# Patient Record
(demographics unavailable — no encounter records)

---

## 2024-11-10 NOTE — REVIEW OF SYSTEMS
[Fever] : no fever [Chest Pain] : no chest pain [Shortness Of Breath] : no shortness of breath [Abdominal Pain] : no abdominal pain [Dysuria] : no dysuria [Joint Pain] : no joint pain [Memory Loss] : no memory loss

## 2024-11-10 NOTE — HISTORY OF PRESENT ILLNESS
[FreeTextEntry1] : cpe [de-identified] : no interval change in status  never got thyroid ultrasound

## 2024-11-10 NOTE — HEALTH RISK ASSESSMENT
[Never (0 pts)] : Never (0 points) [No] : In the past 12 months have you used drugs other than those required for medical reasons? No [0] : 2) Feeling down, depressed, or hopeless: Not at all (0) [Fully functional (bathing, dressing, toileting, transferring, walking, feeding)] : Fully functional (bathing, dressing, toileting, transferring, walking, feeding) [Fully functional (using the telephone, shopping, preparing meals, housekeeping, doing laundry, using] : Fully functional and needs no help or supervision to perform IADLs (using the telephone, shopping, preparing meals, housekeeping, doing laundry, using transportation, managing medications and managing finances) [Smoke Detector] : smoke detector [Carbon Monoxide Detector] : carbon monoxide detector [Seat Belt] :  uses seat belt [Sunscreen] : uses sunscreen [Fair] : ~his/her~ current health as fair  [Good] : ~his/her~  mood as  good [No falls in past year] : Patient reported no falls in the past year [Former] : Former [Patient reported PAP Smear was normal] : Patient reported PAP Smear was normal [Patient reported bone density results were normal] : Patient reported bone density results were normal [Patient reported colonoscopy was normal] : Patient reported colonoscopy was normal [HIV test declined] : HIV test declined [Hepatitis C test declined] : Hepatitis C test declined [Reports changes in vision] : Reports changes in vision [Reports changes in dental health] : Reports changes in dental health [FreeTextEntry1] : Body aches & pains, & coughing (ongoing)  [de-identified] : no [de-identified] : no [Audit-CScore] : 0 [de-identified] : active at work [de-identified] : fair [[id number]] : 0 [Change in mental status noted] : No change in mental status noted [Reports changes in hearing] : Reports no changes in hearing [MammogramDate] : 10/02/2024 [MammogramComments] : Discuss with PT [PapSmearDate] : 01/2022 [BoneDensityDate] : 10/01/2024 [de-identified] : Stye left eye  [ColonoscopyDate] : 11/20/2017 [de-identified] : Tooth pulled last week, needs fillings

## 2024-11-10 NOTE — HEALTH RISK ASSESSMENT
[Never (0 pts)] : Never (0 points) [No] : In the past 12 months have you used drugs other than those required for medical reasons? No [0] : 2) Feeling down, depressed, or hopeless: Not at all (0) [Fully functional (bathing, dressing, toileting, transferring, walking, feeding)] : Fully functional (bathing, dressing, toileting, transferring, walking, feeding) [Fully functional (using the telephone, shopping, preparing meals, housekeeping, doing laundry, using] : Fully functional and needs no help or supervision to perform IADLs (using the telephone, shopping, preparing meals, housekeeping, doing laundry, using transportation, managing medications and managing finances) [Smoke Detector] : smoke detector [Carbon Monoxide Detector] : carbon monoxide detector [Seat Belt] :  uses seat belt [Sunscreen] : uses sunscreen [Fair] : ~his/her~ current health as fair  [Good] : ~his/her~  mood as  good [No falls in past year] : Patient reported no falls in the past year [Former] : Former [Patient reported PAP Smear was normal] : Patient reported PAP Smear was normal [Patient reported bone density results were normal] : Patient reported bone density results were normal [Patient reported colonoscopy was normal] : Patient reported colonoscopy was normal [HIV test declined] : HIV test declined [Hepatitis C test declined] : Hepatitis C test declined [Reports changes in vision] : Reports changes in vision [Reports changes in dental health] : Reports changes in dental health [FreeTextEntry1] : Body aches & pains, & coughing (ongoing)  [de-identified] : no [de-identified] : no [Audit-CScore] : 0 [de-identified] : active at work [de-identified] : fair [[id number]] : 0 [Change in mental status noted] : No change in mental status noted [Reports changes in hearing] : Reports no changes in hearing [MammogramComments] : Discuss with PT [MammogramDate] : 10/02/2024 [PapSmearDate] : 01/2022 [BoneDensityDate] : 10/01/2024 [de-identified] : Stye left eye  [ColonoscopyDate] : 11/20/2017 [de-identified] : Tooth pulled last week, needs fillings

## 2024-11-10 NOTE — PHYSICAL EXAM
[No Acute Distress] : no acute distress [Normal Oropharynx] : the oropharynx was normal [No Lymphadenopathy] : no lymphadenopathy [No Respiratory Distress] : no respiratory distress  [No Edema] : there was no peripheral edema [Regular Rhythm] : with a regular rhythm [Normal Appearance] : normal in appearance [No Masses] : no palpable masses [Soft] : abdomen soft [Non Tender] : non-tender [No CVA Tenderness] : no CVA  tenderness [No Joint Swelling] : no joint swelling [No Focal Deficits] : no focal deficits [Normal Gait] : normal gait [Alert and Oriented x3] : oriented to person, place, and time

## 2024-11-10 NOTE — PLAN
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

## 2024-11-10 NOTE — HEALTH RISK ASSESSMENT
[Never (0 pts)] : Never (0 points) [No] : In the past 12 months have you used drugs other than those required for medical reasons? No [0] : 2) Feeling down, depressed, or hopeless: Not at all (0) [Fully functional (bathing, dressing, toileting, transferring, walking, feeding)] : Fully functional (bathing, dressing, toileting, transferring, walking, feeding) [Fully functional (using the telephone, shopping, preparing meals, housekeeping, doing laundry, using] : Fully functional and needs no help or supervision to perform IADLs (using the telephone, shopping, preparing meals, housekeeping, doing laundry, using transportation, managing medications and managing finances) [Smoke Detector] : smoke detector [Carbon Monoxide Detector] : carbon monoxide detector [Seat Belt] :  uses seat belt [Sunscreen] : uses sunscreen [Fair] : ~his/her~ current health as fair  [Good] : ~his/her~  mood as  good [No falls in past year] : Patient reported no falls in the past year [Former] : Former [Patient reported PAP Smear was normal] : Patient reported PAP Smear was normal [Patient reported bone density results were normal] : Patient reported bone density results were normal [Patient reported colonoscopy was normal] : Patient reported colonoscopy was normal [HIV test declined] : HIV test declined [Hepatitis C test declined] : Hepatitis C test declined [Reports changes in vision] : Reports changes in vision [Reports changes in dental health] : Reports changes in dental health [FreeTextEntry1] : Body aches & pains, & coughing (ongoing)  [de-identified] : no [de-identified] : no [Audit-CScore] : 0 [de-identified] : fair [de-identified] : active at work [[id number]] : 0 [Change in mental status noted] : No change in mental status noted [Reports changes in hearing] : Reports no changes in hearing [MammogramComments] : Discuss with PT [MammogramDate] : 10/02/2024 [PapSmearDate] : 01/2022 [BoneDensityDate] : 10/01/2024 [de-identified] : Stye left eye  [ColonoscopyDate] : 11/20/2017 [de-identified] : Tooth pulled last week, needs fillings

## 2024-11-10 NOTE — PHYSICAL EXAM
[No Acute Distress] : no acute distress [Normal Oropharynx] : the oropharynx was normal [No Lymphadenopathy] : no lymphadenopathy [No Respiratory Distress] : no respiratory distress  [Regular Rhythm] : with a regular rhythm [No Edema] : there was no peripheral edema [Normal Appearance] : normal in appearance [No Masses] : no palpable masses [Soft] : abdomen soft [Non Tender] : non-tender [No CVA Tenderness] : no CVA  tenderness [No Joint Swelling] : no joint swelling [No Focal Deficits] : no focal deficits [Normal Gait] : normal gait [Alert and Oriented x3] : oriented to person, place, and time

## 2024-11-10 NOTE — ASSESSMENT
[FreeTextEntry1] : complex medical patient currently not ill in appearance c/o dry eye in perceived lack of symmetry ocular external appearance ENT needed to evaluate hoarseness and abnormality noted on CT neck involving arytenoid cartilage

## 2024-11-10 NOTE — HISTORY OF PRESENT ILLNESS
[FreeTextEntry1] : cpe [de-identified] : no interval change in status  never got thyroid ultrasound

## 2024-11-10 NOTE — HISTORY OF PRESENT ILLNESS
[FreeTextEntry1] : cpe [de-identified] : no interval change in status  never got thyroid ultrasound

## 2025-03-23 NOTE — HISTORY OF PRESENT ILLNESS
[FreeTextEntry1] : no interval change in status  [de-identified] : concerned about weight  loss although not reflected in today's weight had aspiration L  BREAST cyst suggested to get needle core bx

## 2025-03-23 NOTE — ASSESSMENT
[FreeTextEntry1] : returned to check weight however current weight is not reflective of any weight loss results of needle aspiration discussed  needs evaluation of abnormal Ct neck ENT consult still pending thyroid US with multiple nodules suggestion to follow needs repeat CT aorta to assess thoracic aorta

## 2025-03-23 NOTE — PLAN
[FreeTextEntry1] : pending issues  assessment thoracic aorta ENT to evaluate abd CT neck would need Breast surgery evaluation left office will contact at home

## 2025-03-23 NOTE — HISTORY OF PRESENT ILLNESS
[FreeTextEntry1] : no interval change in status  [de-identified] : concerned about weight  loss although not reflected in today's weight had aspiration L  BREAST cyst suggested to get needle core bx